# Patient Record
Sex: MALE | Race: WHITE | NOT HISPANIC OR LATINO | Employment: UNEMPLOYED | URBAN - METROPOLITAN AREA
[De-identification: names, ages, dates, MRNs, and addresses within clinical notes are randomized per-mention and may not be internally consistent; named-entity substitution may affect disease eponyms.]

---

## 2019-01-16 ENCOUNTER — HOSPITAL ENCOUNTER (EMERGENCY)
Facility: HOSPITAL | Age: 19
Discharge: HOME | End: 2019-01-16
Attending: FAMILY MEDICINE
Payer: OTHER MISCELLANEOUS

## 2019-01-16 VITALS
HEIGHT: 71 IN | RESPIRATION RATE: 20 BRPM | WEIGHT: 170 LBS | OXYGEN SATURATION: 100 % | TEMPERATURE: 98 F | HEART RATE: 60 BPM | SYSTOLIC BLOOD PRESSURE: 128 MMHG | DIASTOLIC BLOOD PRESSURE: 69 MMHG | BODY MASS INDEX: 23.8 KG/M2

## 2019-01-16 DIAGNOSIS — S61.211A LACERATION OF LEFT INDEX FINGER WITHOUT FOREIGN BODY WITHOUT DAMAGE TO NAIL, INITIAL ENCOUNTER: Primary | ICD-10-CM

## 2019-01-16 PROCEDURE — 90715 TDAP VACCINE 7 YRS/> IM: CPT | Mod: ER | Performed by: PHYSICIAN ASSISTANT

## 2019-01-16 PROCEDURE — 63600175 PHARM REV CODE 636 W HCPCS: Mod: ER | Performed by: PHYSICIAN ASSISTANT

## 2019-01-16 PROCEDURE — 99284 EMERGENCY DEPT VISIT MOD MDM: CPT | Mod: 25,ER

## 2019-01-16 PROCEDURE — 90471 IMMUNIZATION ADMIN: CPT | Mod: ER | Performed by: PHYSICIAN ASSISTANT

## 2019-01-16 PROCEDURE — 25000003 PHARM REV CODE 250: Mod: ER | Performed by: PHYSICIAN ASSISTANT

## 2019-01-16 PROCEDURE — 12001 RPR S/N/AX/GEN/TRNK 2.5CM/<: CPT | Mod: ER

## 2019-01-16 RX ORDER — IBUPROFEN 600 MG/1
600 TABLET ORAL EVERY 6 HOURS PRN
Qty: 20 TABLET | Refills: 0 | Status: SHIPPED | OUTPATIENT
Start: 2019-01-16

## 2019-01-16 RX ORDER — LIDOCAINE HYDROCHLORIDE 10 MG/ML
10 INJECTION INFILTRATION; PERINEURAL
Status: COMPLETED | OUTPATIENT
Start: 2019-01-16 | End: 2019-01-16

## 2019-01-16 RX ORDER — CEPHALEXIN 500 MG/1
500 CAPSULE ORAL EVERY 8 HOURS
Qty: 21 CAPSULE | Refills: 0 | Status: SHIPPED | OUTPATIENT
Start: 2019-01-16 | End: 2019-01-23

## 2019-01-16 RX ORDER — IBUPROFEN 400 MG/1
800 TABLET ORAL
Status: COMPLETED | OUTPATIENT
Start: 2019-01-16 | End: 2019-01-16

## 2019-01-16 RX ADMIN — BACITRACIN, NEOMYCIN, POLYMYXIN B 1 EACH: 400; 3.5; 5 OINTMENT TOPICAL at 03:01

## 2019-01-16 RX ADMIN — LIDOCAINE HYDROCHLORIDE 10 ML: 10 INJECTION, SOLUTION EPIDURAL; INFILTRATION; INTRACAUDAL; PERINEURAL at 03:01

## 2019-01-16 RX ADMIN — CLOSTRIDIUM TETANI TOXOID ANTIGEN (FORMALDEHYDE INACTIVATED), CORYNEBACTERIUM DIPHTHERIAE TOXOID ANTIGEN (FORMALDEHYDE INACTIVATED), BORDETELLA PERTUSSIS TOXOID ANTIGEN (GLUTARALDEHYDE INACTIVATED), BORDETELLA PERTUSSIS FILAMENTOUS HEMAGGLUTININ ANTIGEN (FORMALDEHYDE INACTIVATED), BORDETELLA PERTUSSIS PERTACTIN ANTIGEN, AND BORDETELLA PERTUSSIS FIMBRIAE 2/3 ANTIGEN 0.5 ML: 5; 2; 2.5; 5; 3; 5 INJECTION, SUSPENSION INTRAMUSCULAR at 03:01

## 2019-01-16 RX ADMIN — IBUPROFEN 800 MG: 400 TABLET, FILM COATED ORAL at 03:01

## 2019-01-16 NOTE — ED PROVIDER NOTES
Encounter Date: 1/16/2019       History     Chief Complaint   Patient presents with    Laceration     Pt states was climbing and finger was cut on a piece of jagged joselyn plascencia.  + jagged edge laceration noted  to right index finger.       Tommy Mccoy 18 y.o. male presented to the ED with c/o laceration to the left index finger that occurred just prior to arrival.  He states that he was moving sharp piece of metal when the edge caused laceration.  He does report that metal was rested and that it had small flakes attached to it however has low suspicion of foreign body.  He reports pain is worse with palpation in the bleeding was controlled prior to arrival.  He denies any numbness, tingling, decreased range of motion or pain radiation.  He has not tried any medications for symptoms. He is unsure of his last tetanus vaccine.        The history is provided by the patient.     Review of patient's allergies indicates:  No Known Allergies  History reviewed. No pertinent past medical history.  History reviewed. No pertinent surgical history.  Family History   Problem Relation Age of Onset    No Known Problems Mother     No Known Problems Father      Social History     Tobacco Use    Smoking status: Never Smoker    Smokeless tobacco: Never Used   Substance Use Topics    Alcohol use: No     Frequency: Never    Drug use: No     Review of Systems   Musculoskeletal: Negative for arthralgias, back pain and myalgias.   Skin: Positive for wound. Negative for rash.   Neurological: Negative for weakness and numbness.   Hematological: Does not bruise/bleed easily.       Physical Exam     Initial Vitals [01/16/19 1509]   BP Pulse Resp Temp SpO2   (!) 144/85 73 18 97.8 °F (36.6 °C) 100 %      MAP       --         Physical Exam    Nursing note and vitals reviewed.  Constitutional: He appears well-developed and well-nourished. He is cooperative.  Non-toxic appearance. He does not appear ill.   Minimal distress   HENT:   Head:  Normocephalic and atraumatic.   Eyes: Conjunctivae and lids are normal.   Neck: Normal range of motion.   Cardiovascular: Normal rate.   Pulmonary/Chest: No respiratory distress.   Abdominal: Normal appearance.   Musculoskeletal: Normal range of motion.        Left hand: He exhibits tenderness and laceration. He exhibits normal range of motion, no bony tenderness, normal capillary refill and no deformity. Normal sensation noted.        Hands:   1.5 cm laceration to the left volar index finger with no active bleeding, tendon involvement and well approximated edges. FROM of all joints on affected extremities, sensation and capillary refill intact.      Neurological: He is alert and oriented to person, place, and time. He has normal strength. No sensory deficit. GCS eye subscore is 4. GCS verbal subscore is 5. GCS motor subscore is 6.   Skin: Skin is warm, dry and intact. No rash noted.   Psychiatric: He has a normal mood and affect. His speech is normal and behavior is normal. Thought content normal.         ED Course   Lac Repair  Date/Time: 1/16/2019 4:57 PM  Performed by: EDUARDA Stockton  Authorized by: Anton Kaufman MD   Consent Done: Yes  Consent: Verbal consent obtained.  Risks and benefits: risks, benefits and alternatives were discussed  Consent given by: patient  Patient understanding: patient states understanding of the procedure being performed  Body area: upper extremity  Location details: left index finger  Laceration length: 1.5 cm  Foreign bodies: no foreign bodies  Tendon involvement: none  Nerve involvement: none  Anesthesia: digital block    Anesthesia:  Local Anesthetic: lidocaine 1% without epinephrine  Anesthetic total: 4 mL  Patient sedated: no  Preparation: Patient was prepped and draped in the usual sterile fashion.  Irrigation solution: saline  Amount of cleaning: standard  Debridement: minimal  Skin closure: Ethilon (4-0)  Number of sutures: 3  Technique: simple  Approximation:  close  Approximation difficulty: simple  Dressing: antibiotic ointment and non-stick sterile dressing  Patient tolerance: Patient tolerated the procedure well with no immediate complications        Labs Reviewed - No data to display       Imaging Results          X-Ray Finger 2 or More Views Left (Final result)  Result time 01/16/19 15:48:45    Final result by SRIKANTH Ngo Sr., MD (01/16/19 15:48:45)                 Impression:      Normal study.      Electronically signed by: Cali Ngo MD  Date:    01/16/2019  Time:    15:48             Narrative:    EXAMINATION:  XR FINGER 2 OR MORE VIEWS LEFT    CLINICAL HISTORY:  laceration;    COMPARISON:  None    FINDINGS:  There is no fracture. There is no dislocation.  There is no radiopaque foreign body visualized.                                Tommy Mccoy 18 y.o. male presented to the ED with c/o laceration to the left index finger that occurred just prior to arrival.  He states that he was moving sharp piece of metal when the edge caused laceration.  He does report that metal was rested and that it had small flakes attached to it however has low suspicion of foreign body.  He reports pain is worse with palpation in the bleeding was controlled prior to arrival.  He denies any numbness, tingling, decreased range of motion or pain radiation.  He has not tried any medications for symptoms. He is unsure of his last tetanus vaccine.  ROS positive for laceration.  Physical exam reveals patient in minimal distress with 1.5 cm laceration to the left volar index finger with no active bleeding, tendon involvement and well approximated edges. FROM of all joints on affected extremities, sensation and capillary refill intact.     DDX: laceration simple versus complicated    ED management: wound cleaned and irrigated, lac repair; see procedure note. Motrin with reduction of pain. Tdap updated in the ED.  X-ray with no bony involvement and no foreign bodies visualized at this  time.. He was counseled about the risk of possible retained foreign body.  No signs of tendon involvement at this time.     Impression/Plan: The encounter diagnosis was Laceration of left index finger without foreign body without damage to nail, initial encounter.  Discharged Keflex and Motrin.  Patient will follow up with Primary.  Patient cautioned on when to return to ED.  Pt. Understands and agrees with current treatment plan                           Clinical Impression:   The encounter diagnosis was Laceration of left index finger without foreign body without damage to nail, initial encounter.                             EDUARDA Stockton  01/16/19 4077